# Patient Record
Sex: MALE | Race: WHITE | NOT HISPANIC OR LATINO | Employment: FULL TIME | URBAN - NONMETROPOLITAN AREA
[De-identification: names, ages, dates, MRNs, and addresses within clinical notes are randomized per-mention and may not be internally consistent; named-entity substitution may affect disease eponyms.]

---

## 2017-01-06 ENCOUNTER — OFFICE VISIT (OUTPATIENT)
Dept: ORTHOPEDIC SURGERY | Facility: CLINIC | Age: 22
End: 2017-01-06

## 2017-01-06 DIAGNOSIS — S83.242D ACUTE MEDIAL MENISCUS TEAR OF LEFT KNEE, SUBSEQUENT ENCOUNTER: ICD-10-CM

## 2017-01-06 DIAGNOSIS — M25.561 RIGHT KNEE PAIN, UNSPECIFIED CHRONICITY: Primary | ICD-10-CM

## 2017-01-06 PROCEDURE — 99024 POSTOP FOLLOW-UP VISIT: CPT | Performed by: NURSE PRACTITIONER

## 2017-01-06 RX ORDER — HYDROCODONE BITARTRATE AND ACETAMINOPHEN 7.5; 325 MG/1; MG/1
TABLET ORAL
Refills: 0 | COMMUNITY
Start: 2016-12-30 | End: 2017-06-12

## 2017-01-06 NOTE — PROGRESS NOTES
Marck Montgomery is a 21 y.o. male is s/p right knee scope medial nywgkkox08/30/16.      Chief Complaint   Patient presents with   • Right Knee - Post-op Knee       HISTORY OF PRESENT ILLNESS: Sutures removed and steri strips placed. Still wearing a knee immobilizer. [facility]        No Known Allergies      Current Outpatient Prescriptions:   •  HYDROcodone-acetaminophen (NORCO) 7.5-325 MG per tablet, TAKE 1 TABLET BY MOUTH EVERY 4 TO 6 HOURS AS NEEDED FOR PAIN, Disp: , Rfl: 0    No fevers or chills.  No nausea or vomiting.      PHYSICAL EXAMINATION:       Marck Montgomery is a 21 y.o. male    Patient is awake and alert, answers questions appropriately and is in no apparent distress.    GAIT:     []  Normal  []  Antalgic    Assistive device: []  None  []  Walker     [x]  Crutches  []  Cane     []  Wheelchair  []  Stretcher    Right Knee Exam     Other   Other tests: effusion (small efffusion ) present    Comments:  Sutures removed, edges approximated, no evidence of infection       Left Knee Exam   Left knee exam is normal.    Muscle Strength     The patient has normal left knee strength.              No results found.        ASSESSMENT:    Diagnoses and all orders for this visit:    Right knee pain, unspecified chronicity    Acute medial meniscus tear of left knee, subsequent encounter    Other orders  -     HYDROcodone-acetaminophen (NORCO) 7.5-325 MG per tablet; TAKE 1 TABLET BY MOUTH EVERY 4 TO 6 HOURS AS NEEDED FOR PAIN          PLAN  Recommend continued non-weight baring status, PT/HEP and f/u with Dr. Israel in approximally one month.   No Follow-up on file.    Duran Pierre, APRN

## 2017-01-06 NOTE — MR AVS SNAPSHOT
Marck Taiwo Montgomery   1/6/2017 2:00 PM   Office Visit    Dept Phone:  [phone]   Encounter #:  [id number]    Provider:  KEVIN Corea   Department:  [facility]                Your Full Care Plan              Your Updated Medication List          This list is accurate as of: 1/6/17  2:48 PM.  Always use your most recent med list.                HYDROcodone-acetaminophen 7.5-325 MG per tablet   Commonly known as:  NORCO               You Were Diagnosed With        Codes Comments    Right knee pain, unspecified chronicity    -  Primary ICD-10-CM: M25.561  ICD-9-CM: 719.46     Acute medial meniscus tear of left knee, subsequent encounter     ICD-10-CM: S83.242D  ICD-9-CM: V58.89, 836.0       Instructions     None    Patient Instructions History      Upcoming Appointments     Visit Type Date Time Department    POST-OP 1/6/2017  2:00 PM [facility]    FOLLOW UP 2/17/2017  3:30 PM [facility]      MyChart Signup     Our records indicate that you have declined [facility]. If you would like to sign up for [facility], please email [email or web address] or call [phone] to obtain an activation code.             Other Info from Your Visit           Your Appointments     Feb 17, 2017  3:30 PM CST   Follow Up with KEVIN Corea   [facility] (--)    [address]  [facility] [address] [phone]           Arrive 15 minutes prior to appointment.              Allergies     No Known Allergies      Reason for Visit     Right Knee - Post-op Knee           Problems and Diagnoses Noted     Acute medial meniscus tear of left knee    Right knee pain

## 2017-02-17 ENCOUNTER — OFFICE VISIT (OUTPATIENT)
Dept: ORTHOPEDIC SURGERY | Facility: CLINIC | Age: 22
End: 2017-02-17

## 2017-02-17 DIAGNOSIS — M25.561 RIGHT KNEE PAIN, UNSPECIFIED CHRONICITY: Primary | ICD-10-CM

## 2017-02-17 DIAGNOSIS — S83.242D ACUTE MEDIAL MENISCUS TEAR OF LEFT KNEE, SUBSEQUENT ENCOUNTER: ICD-10-CM

## 2017-02-17 DIAGNOSIS — M23.91 INTERNAL DERANGEMENT OF RIGHT KNEE: ICD-10-CM

## 2017-02-17 PROCEDURE — 99024 POSTOP FOLLOW-UP VISIT: CPT | Performed by: NURSE PRACTITIONER

## 2017-02-17 NOTE — PROGRESS NOTES
Marck Montgomery is a 21 y.o. male returns for     Chief Complaint   Patient presents with   • Right Knee - Follow-up       HISTORY OF PRESENT ILLNESS: Arthroscopy of the right knee with repair of the  medial meniscus done on 12/30/2016, physical therapy at [facility]. Patient not having any problems.        CONCURRENT MEDICAL HISTORY:    No past medical history on file.    No Known Allergies      Current Outpatient Prescriptions:   •  HYDROcodone-acetaminophen (NORCO) 7.5-325 MG per tablet, TAKE 1 TABLET BY MOUTH EVERY 4 TO 6 HOURS AS NEEDED FOR PAIN, Disp: , Rfl: 0    No past surgical history on file.    ROS  No fevers or chills.  No chest pain or shortness of air.  No GI or  disturbances.    PHYSICAL EXAMINATION:       There were no vitals taken for this visit.    Physical Exam   Constitutional: He is oriented to person, place, and time. Vital signs are normal. He appears well-developed and well-nourished. He is cooperative.   HENT:   Head: Normocephalic and atraumatic.   Neck: Trachea normal and phonation normal.   Pulmonary/Chest: Effort normal. No respiratory distress.   Abdominal: Soft. Normal appearance. He exhibits no distension.   Musculoskeletal:        Right knee: He exhibits no effusion.   Neurological: He is alert and oriented to person, place, and time. GCS eye subscore is 4. GCS verbal subscore is 5. GCS motor subscore is 6.   Skin: Skin is warm, dry and intact.   Psychiatric: He has a normal mood and affect. His speech is normal and behavior is normal. Judgment and thought content normal. Cognition and memory are normal.   Vitals reviewed.      GAIT:     [x]  Normal  []  Antalgic    Assistive device: [x]  None  []  Walker     []  Crutches  []  Cane     []  Wheelchair  []  Stretcher    Right Knee Exam     Tenderness   The patient is experiencing no tenderness.         Range of Motion   Extension: normal   Right knee flexion: 130.     Other   Erythema: absent  Scars: present  Sensation:  normal  Pulse: present  Swelling: none  Other tests: no effusion present      Left Knee Exam   Left knee exam is normal.    Muscle Strength     The patient has normal left knee strength.              No results found.          ASSESSMENT:    Diagnoses and all orders for this visit:    Right knee pain, unspecified chronicity    Acute medial meniscus tear of left knee, subsequent encounter    Internal derangement of right knee          PLAN recommend progressive range of motion and activity as tolerated based on pain.  Continued use of brace during periods of activity and continue physical therapy at this time.  Follow-up with Dr. Israel in 4-6 weeks for recheck.    KEVIN Pace

## 2017-06-12 ENCOUNTER — OFFICE VISIT (OUTPATIENT)
Dept: ORTHOPEDIC SURGERY | Facility: CLINIC | Age: 22
End: 2017-06-12

## 2017-06-12 VITALS — BODY MASS INDEX: 18.96 KG/M2 | WEIGHT: 140 LBS | HEIGHT: 72 IN

## 2017-06-12 DIAGNOSIS — M25.561 RIGHT KNEE PAIN, UNSPECIFIED CHRONICITY: ICD-10-CM

## 2017-06-12 DIAGNOSIS — S83.242D ACUTE MEDIAL MENISCUS TEAR OF LEFT KNEE, SUBSEQUENT ENCOUNTER: Primary | ICD-10-CM

## 2017-06-12 PROCEDURE — 99213 OFFICE O/P EST LOW 20 MIN: CPT | Performed by: NURSE PRACTITIONER

## 2017-06-12 NOTE — PROGRESS NOTES
"Marck Montgomery is a 21 y.o. male returns for     Chief Complaint   Patient presents with   • Right Knee - Follow-up       HISTORY OF PRESENT ILLNESS:    Right knee pain improved, has an occasional pain.  He not resumed normal sports related activity at this time     CONCURRENT MEDICAL HISTORY:    History reviewed. No pertinent past medical history.    No Known Allergies    No current outpatient prescriptions on file.    Past Surgical History:   Procedure Laterality Date   • KNEE ARTHROSCOPY         ROS  No fevers or chills.  No chest pain or shortness of air.  No GI or  disturbances.    PHYSICAL EXAMINATION:       Ht 72\" (182.9 cm)  Wt 140 lb (63.5 kg)  BMI 18.99 kg/m2    Physical Exam   Constitutional: He is oriented to person, place, and time. Vital signs are normal. He appears well-developed and well-nourished. He is cooperative.   HENT:   Head: Normocephalic and atraumatic.   Neck: Trachea normal and phonation normal.   Pulmonary/Chest: Effort normal. No respiratory distress.   Abdominal: Soft. Normal appearance. He exhibits no distension.   Neurological: He is alert and oriented to person, place, and time. GCS eye subscore is 4. GCS verbal subscore is 5. GCS motor subscore is 6.   Skin: Skin is warm, dry and intact.   Psychiatric: He has a normal mood and affect. His speech is normal and behavior is normal. Judgment and thought content normal. Cognition and memory are normal.   Vitals reviewed.      GAIT:     [x]  Normal  []  Antalgic    Assistive device: [x]  None  []  Walker     []  Crutches  []  Cane     []  Wheelchair  []  Stretcher    Left Ankle Exam   Left ankle exam is normal.      Right Knee Exam     Tenderness   The patient is experiencing no tenderness.         Range of Motion   Extension: normal   Right knee flexion: 130.     Tests   [place]:  Medial - negative Lateral - negative    Other   Erythema: absent  Scars: present  Sensation: normal  Pulse: present  Swelling: none      Left Knee " Exam   Left knee exam is normal.    Muscle Strength     The patient has normal left knee strength.              No results found.          ASSESSMENT:    Diagnoses and all orders for this visit:    Acute medial meniscus tear of left knee, subsequent encounter    Right knee pain, unspecified chronicity          PLAN  Recommend progression of physical activity starting next month as tolerated based on pain.    F/u with Dr. Israel within the next three months.      No Follow-up on file.    Duran Pierre, APRN

## 2017-12-26 ENCOUNTER — OFFICE VISIT (OUTPATIENT)
Dept: ORTHOPEDIC SURGERY | Facility: CLINIC | Age: 22
End: 2017-12-26

## 2017-12-26 VITALS — BODY MASS INDEX: 18.69 KG/M2 | HEIGHT: 72 IN | WEIGHT: 138 LBS

## 2017-12-26 DIAGNOSIS — M25.561 RIGHT KNEE PAIN, UNSPECIFIED CHRONICITY: ICD-10-CM

## 2017-12-26 DIAGNOSIS — S83.242D ACUTE MEDIAL MENISCUS TEAR OF LEFT KNEE, SUBSEQUENT ENCOUNTER: Primary | ICD-10-CM

## 2017-12-26 DIAGNOSIS — M25.562 LEFT KNEE PAIN, UNSPECIFIED CHRONICITY: Primary | ICD-10-CM

## 2017-12-26 PROCEDURE — 99214 OFFICE O/P EST MOD 30 MIN: CPT | Performed by: NURSE PRACTITIONER

## 2017-12-26 NOTE — PROGRESS NOTES
"Marck Montgomery is a 22 y.o. male returns for     Chief Complaint   Patient presents with   • Right Knee - Establish Care     Xray done today.        HISTORY OF PRESENT ILLNESS: Patient having increased pain. Had knee scope 12/30/16 by Dr Israel.  States that he was at home when he felt a pop and tearing sensation in the right knee.    Overall the pain has improved but he still continues to feel some abnormal sensation.      CONCURRENT MEDICAL HISTORY:    History reviewed. No pertinent past medical history.    Allergies   Allergen Reactions   • Aleve [Naproxen Sodium]        No current outpatient prescriptions on file.    Past Surgical History:   Procedure Laterality Date   • KNEE ARTHROSCOPY         ROS  No fevers or chills.  No chest pain or shortness of air.  No GI or  disturbances.    PHYSICAL EXAMINATION:       Ht 182.9 cm (72\")  Wt 62.6 kg (138 lb)  BMI 18.72 kg/m2    Physical Exam   Constitutional: He is oriented to person, place, and time. Vital signs are normal. He appears well-developed and well-nourished. He is cooperative.   HENT:   Head: Normocephalic and atraumatic.   Neck: Trachea normal and phonation normal.   Pulmonary/Chest: Effort normal. No respiratory distress.   Abdominal: Soft. Normal appearance. He exhibits no distension.   Musculoskeletal:        Left knee: He exhibits no effusion.   Neurological: He is alert and oriented to person, place, and time. GCS eye subscore is 4. GCS verbal subscore is 5. GCS motor subscore is 6.   Skin: Skin is warm, dry and intact.   Psychiatric: He has a normal mood and affect. His speech is normal and behavior is normal. Judgment and thought content normal. Cognition and memory are normal.   Vitals reviewed.      GAIT:     [x]  Normal  []  Antalgic    Assistive device: [x]  None  []  Walker     []  Crutches  []  Cane     []  Wheelchair  []  Stretcher    Right Knee Exam   Right knee exam is normal.    Muscle Strength     The patient has normal right knee " strength.      Left Knee Exam     Tenderness   The patient is experiencing tenderness in the medial joint line.    Range of Motion   Extension: normal   Flexion: normal     Tests   [place]:  Medial - positive Lateral - positive    Other   Erythema: absent  Scars: absent  Sensation: normal  Pulse: present  Swelling: mild  Effusion: no effusion present              No results found.          ASSESSMENT:    Diagnoses and all orders for this visit:    Acute medial meniscus tear of left knee, subsequent encounter    Right knee pain, unspecified chronicity          PLAN  Discussed the options with the patient and his mother and he will try ibuprofen, ice, HEP and rest consistently and contact us if pain fails to improve in a two- four week period.   No Follow-up on file.    Duran Pierre, APRN

## 2018-04-18 ENCOUNTER — TELEPHONE (OUTPATIENT)
Dept: ORTHOPEDIC SURGERY | Facility: CLINIC | Age: 23
End: 2018-04-18

## 2018-04-18 DIAGNOSIS — M23.92 INTERNAL DERANGEMENT OF KNEE JOINT, LEFT: Primary | ICD-10-CM

## 2018-05-09 ENCOUNTER — HOSPITAL ENCOUNTER (OUTPATIENT)
Dept: MRI IMAGING | Facility: HOSPITAL | Age: 23
Discharge: HOME | End: 2018-05-09
Admitting: NURSE PRACTITIONER

## 2018-05-09 DIAGNOSIS — M23.91 INTERNAL DERANGEMENT OF KNEE JOINT, RIGHT: ICD-10-CM

## 2018-05-09 DIAGNOSIS — M23.92 INTERNAL DERANGEMENT OF KNEE JOINT, LEFT: ICD-10-CM

## 2018-05-09 PROCEDURE — 73721 MRI JNT OF LWR EXTRE W/O DYE: CPT

## 2018-05-11 ENCOUNTER — OFFICE VISIT (OUTPATIENT)
Dept: ORTHOPEDIC SURGERY | Facility: CLINIC | Age: 23
End: 2018-05-11

## 2018-05-11 VITALS — BODY MASS INDEX: 18.5 KG/M2 | WEIGHT: 136.6 LBS | HEIGHT: 72 IN

## 2018-05-11 DIAGNOSIS — M23.91 INTERNAL DERANGEMENT OF RIGHT KNEE: Primary | ICD-10-CM

## 2018-05-11 DIAGNOSIS — M25.561 RIGHT KNEE PAIN, UNSPECIFIED CHRONICITY: ICD-10-CM

## 2018-05-11 PROCEDURE — 99214 OFFICE O/P EST MOD 30 MIN: CPT | Performed by: NURSE PRACTITIONER

## 2018-05-11 NOTE — PROGRESS NOTES
"Marck Montgomery is a 22 y.o. male returns for     Chief Complaint   Patient presents with   • Right Knee - Follow-up, Results     MRI [facility] 5/9/18       HISTORY OF PRESENT ILLNESS:    22-year-old  male in the office today for follow-up of right knee pain.  He reports that his symptoms continue without improvement.       CONCURRENT MEDICAL HISTORY:    No past medical history on file.    Allergies   Allergen Reactions   • Aleve [Naproxen Sodium] Palpitations       No current outpatient prescriptions on file.    Past Surgical History:   Procedure Laterality Date   • KNEE ARTHROSCOPY         ROS  No fevers or chills.  No chest pain or shortness of air.  No GI or  disturbances.    PHYSICAL EXAMINATION:       Ht 182.9 cm (72\")   Wt 62 kg (136 lb 9.6 oz)   BMI 18.53 kg/m²     Physical Exam   Constitutional: He is oriented to person, place, and time. Vital signs are normal. He appears well-developed and well-nourished. He is cooperative.   HENT:   Head: Normocephalic and atraumatic.   Neck: Trachea normal and phonation normal.   Pulmonary/Chest: Effort normal. No respiratory distress.   Abdominal: Soft. Normal appearance. He exhibits no distension.   Musculoskeletal:        Left knee: He exhibits no effusion.   Neurological: He is alert and oriented to person, place, and time. GCS eye subscore is 4. GCS verbal subscore is 5. GCS motor subscore is 6.   Skin: Skin is warm, dry and intact.   Psychiatric: He has a normal mood and affect. His speech is normal and behavior is normal. Judgment and thought content normal. Cognition and memory are normal.   Vitals reviewed.      GAIT:     [x]  Normal  []  Antalgic    Assistive device: [x]  None  []  Walker     []  Crutches  []  Cane     []  Wheelchair  []  Stretcher    Right Knee Exam   Right knee exam is normal.    Muscle Strength     The patient has normal right knee strength.      Left Knee Exam     Tenderness   The patient is experiencing tenderness in the " medial joint line.    Range of Motion   Extension: normal   Flexion: normal     Tests   [place]:  Medial - positive     Other   Erythema: absent  Scars: absent  Sensation: normal  Pulse: present  Swelling: mild  Effusion: no effusion present              Mri Knee Left Without Contrast    Result Date: 5/9/2018  Narrative: MRI left knee. HISTORY: Left knee pain. Prior meniscal surgery. Prior x-ray: Knees December 26, 2017. FINDINGS: Normal medial collateral ligament and lateral collateral ligament complex. Normal patellar articular hyaline cartilage. Normal anterior and posterior cruciate ligaments. Normal lateral meniscus. There is irregularity of the anterior horn medial meniscus which may represent surgical changes i.e. partial meniscectomy versus a subtle tear. Normal posterior horn. No bony abnormalities or effusion. MRI left knee is otherwise unremarkable.     Impression: CONCLUSION: Irregularity anterior horn medial meniscus which may resent prior surgery surgery i.e. partial meniscectomy versus a subtle tear. Normal posterior horn medial meniscus. MRI left knee is otherwise unremarkable. Electronically signed by:  Tsuhar Keys MD  5/9/2018 3:19 PM CDT Workstation: [id number]            ASSESSMENT:    Diagnoses and all orders for this visit:    Internal derangement of right knee    Right knee pain, unspecified chronicity          PLAN  Dr. Israel reviewed MRI films, we feel that this may be residual surgical changes from previous meniscal repairs however if he continues to experience symptoms and a diagnostic arthroscopy may be necessary to fully visualize the meniscus and rule out a tear.  Patient and his mother verbalized understanding of the treatment plan and I'll follow-up with Dr. Israel pain continues.  No Follow-up on file.    KEVIN Pace

## 2018-05-16 ENCOUNTER — TELEPHONE (OUTPATIENT)
Dept: ORTHOPEDIC SURGERY | Facility: CLINIC | Age: 23
End: 2018-05-16

## 2018-05-16 DIAGNOSIS — M25.561 RIGHT KNEE PAIN, UNSPECIFIED CHRONICITY: Primary | ICD-10-CM

## 2018-05-16 DIAGNOSIS — S83.242D ACUTE MEDIAL MENISCUS TEAR OF LEFT KNEE, SUBSEQUENT ENCOUNTER: ICD-10-CM

## 2018-05-16 NOTE — TELEPHONE ENCOUNTER
Jillian called from therapy, patient thought he was going to be referred to physical therapy.

## 2018-06-14 ENCOUNTER — OFFICE VISIT (OUTPATIENT)
Dept: ORTHOPEDIC SURGERY | Facility: CLINIC | Age: 23
End: 2018-06-14

## 2018-06-14 VITALS — BODY MASS INDEX: 18.83 KG/M2 | WEIGHT: 139 LBS | HEIGHT: 72 IN

## 2018-06-14 DIAGNOSIS — M23.91 INTERNAL DERANGEMENT OF RIGHT KNEE: ICD-10-CM

## 2018-06-14 DIAGNOSIS — M25.561 RIGHT KNEE PAIN, UNSPECIFIED CHRONICITY: Primary | ICD-10-CM

## 2018-06-14 PROCEDURE — 99213 OFFICE O/P EST LOW 20 MIN: CPT | Performed by: ORTHOPAEDIC SURGERY

## 2018-06-14 NOTE — PROGRESS NOTES
"Marck Montgomery is a 22 y.o. male returns for     Chief Complaint   Patient presents with   • Right Knee - Follow-up, Pain       HISTORY OF PRESENT ILLNESS: f/u  Right knee patient states that knee pain is better since last visit. Physical therapy with izabella al.   Has now returned to all activity  Occasional soreness  No pain with pivot or twist       CONCURRENT MEDICAL HISTORY:    No past medical history on file.    Allergies   Allergen Reactions   • Aleve [Naproxen Sodium] Palpitations       No current outpatient prescriptions on file.    Past Surgical History:   Procedure Laterality Date   • KNEE ARTHROSCOPY         ROS  No fevers or chills.  No chest pain or shortness of air.  No GI or  disturbances.    PHYSICAL EXAMINATION:       Ht 182.9 cm (72\")   Wt 63 kg (139 lb)   BMI 18.85 kg/m²     Physical Exam   Constitutional: He is oriented to person, place, and time. He appears well-developed and well-nourished.   Neurological: He is alert and oriented to person, place, and time.   Psychiatric: He has a normal mood and affect. His behavior is normal. Judgment and thought content normal.       GAIT:     [x]  Normal  []  Antalgic    Assistive device: [x]  None  []  Walker     []  Crutches  []  Cane     []  Wheelchair  []  Stretcher    Right Knee Exam     Tenderness   The patient is experiencing no tenderness.         Range of Motion   The patient has normal right knee ROM.    Muscle Strength     The patient has normal right knee strength.    Tests   Drawer:       Anterior - negative        Other   Erythema: absent  Sensation: normal  Pulse: present  Swelling: none                      ASSESSMENT:    Diagnoses and all orders for this visit:    Right knee pain, unspecified chronicity    Internal derangement of right knee          PLAN    Activity as tolerated  Pain is guide  Can return to any activity, no restrictions  Discussed possible repeat MRI if symptoms return    Patient's Body mass index is 18.85 " kg/m². BMI is within normal parameters. No follow-up required.      Return if symptoms worsen or fail to improve, for recheck.    Rupert Israel MD